# Patient Record
(demographics unavailable — no encounter records)

---

## 2025-04-01 NOTE — PHYSICAL EXAM
[Urethral Meatus] : meatus normal [Testes Tenderness] : no tenderness of the testes [Testes Mass (___cm)] : there were no testicular masses [Prostate Tenderness] : the prostate was not tender [de-identified] : RUTH: porostate non-tender, mild point enderness R pelvic floor [Chaperone Present] : A chaperone was present in the examining room during all aspects of the physical examination [FreeTextEntry2] : Mandi LOFTON

## 2025-04-01 NOTE — LETTER BODY
[Dear  ___] : Dear  [unfilled], [Courtesy Letter:] : I had the pleasure of seeing your patient, [unfilled], in my office today. [Please see my note below.] : Please see my note below. [Consult Closing:] : Thank you very much for allowing me to participate in the care of this patient.  If you have any questions, please do not hesitate to contact me. [Sincerely,] : Sincerely, [FreeTextEntry3] : Sherly Barnett MD Director of Robotic Education [facility] at [facility]  [email or web address] [phone] ([place]) [phone]  ([facility])

## 2025-04-01 NOTE — HISTORY OF PRESENT ILLNESS
[FreeTextEntry1] : Name BASILIA HERNANDEZ MRN [id number]  1985 Contact Number: [phone] ----------------------------------------------------------------------------------------------------------------------------------------- Date of First Visit: 23 Referring Provider/PCP: Dr. Serafin Graham (f. [phone]) -----------------------------------------------------------------------------------------------------------------------------------------  CC: testicular pain  History of Present Illness: BASILIA HERNANDEZ is a 38 year old male who presents for evaluation of left-sided testicular pain. Pain began about 3 weeks ago and is intermittent in frequency. Started as achiness tightness. Had similar pains in past that went away on its own, especially worse in college. Had been doing a lot of biking. Then last Friday became more comfortable. Worse with sitting still. Better with standing and stretching. Tylenol helps pain. Describes the pain as dull in quality without radiation. Left testicle feels heavier than right over this time. More surrounding the testicle in groin. By  pain had improved. Saw PCP on  - did not send any labs or urine studies, but started patient on cipro., which he started yesterday  Rated at 6/10 at its worst. Currently has 2-4/10 pain. No associated LUTS or gross hematuria. Denies any lumps or masses. Denies prior sexually transmitted infections. Denies prior scrotal trauma, abdominal surgery, scrotal surgery, or inguinal surgery. No fevers or chills. No N/V.  Patient reports in May/ had 1 month of frequent urination. Had urine tests which was reportedly wnl. Took course of doxycycline but patient did not complete. Symptoms resolved on their own. Also felt was sweatier around that time and got laser hair removal, which he felt resolved the sweatiness. No current urinary symptoms - mild frequency related to fluid intake. Patient not sexually active, and he is not concerned for STIs. Patient reports years ago had a UTI, but nothing recently.  Irritants: a lot of coffee, a lot of carbonation, + citrus, occasional tomatoes, moderate spicy food, moderate pineapple, moderate acidic foods  IPSS 5 QOL 2 MADHAVI 21  Scrotal US in office: Boderline varicocele on the right measuring 2.6 mm, Varicocele on the left 3.4 mm. Microcalcification on the left testes. ---------------------------------------------------------------------------------------------------------------------------------------- Interval History (2023):  Patient reports pain has largely improved, but not completely resolved at this time. Yoga/stretching has helped. Not bothersome overall.  Underwent SA at [facility]: normal volume, concentration 23.5, total count 58.75, 51% motility, 3% normal morphology (normal 4%). Discussed implications. ---------------------------------------------------------------------------------------------------------------------------------------- Interval History (2025):  Patient reports over last few months developed increased frequency, feeling of incomplete emptying, mild suprapubic discomfort. Saw PCP and urine studies and STI all wnl. Since improved on its own but not back to baseline. Now urinating every 1-2 hours depending on fluid. Also reports since last visit feels less volume/pressure ejaculate. Erections weaker than they were in the past. No more testicular pain - this issue resolved. Did not repeat SA - will do at some point in future.   IPSS 13 QOL 3 MADHAVI 17 PVR (to ensure adequate emptying): 2 ----------------------------------------------------------------------------------------------------------------------------------------  PMH: anxiety/depression PSH: none Family History: HTN, no  malignancies Social: works in finance, single, no children, never smoker, social alcohol, occasional edibles Meds: cipro currently, no bl meds Allergies: NKDA, seasonal ROS: no fevers, chills

## 2025-04-01 NOTE — HISTORY OF PRESENT ILLNESS
[FreeTextEntry1] : Name BASILIA HERNANDEZ MRN [id number]  1985 Contact Number: [phone] ----------------------------------------------------------------------------------------------------------------------------------------- Date of First Visit: 23 Referring Provider/PCP: Dr. Serafin Graham (f. [phone]) -----------------------------------------------------------------------------------------------------------------------------------------  CC: testicular pain  History of Present Illness: BASILIA HERNANDEZ is a 38 year old male who presents for evaluation of left-sided testicular pain. Pain began about 3 weeks ago and is intermittent in frequency. Started as achiness tightness. Had similar pains in past that went away on its own, especially worse in college. Had been doing a lot of biking. Then last Friday became more comfortable. Worse with sitting still. Better with standing and stretching. Tylenol helps pain. Describes the pain as dull in quality without radiation. Left testicle feels heavier than right over this time. More surrounding the testicle in groin. By  pain had improved. Saw PCP on  - did not send any labs or urine studies, but started patient on cipro., which he started yesterday  Rated at 6/10 at its worst. Currently has 2-4/10 pain. No associated LUTS or gross hematuria. Denies any lumps or masses. Denies prior sexually transmitted infections. Denies prior scrotal trauma, abdominal surgery, scrotal surgery, or inguinal surgery. No fevers or chills. No N/V.  Patient reports in May/ had 1 month of frequent urination. Had urine tests which was reportedly wnl. Took course of doxycycline but patient did not complete. Symptoms resolved on their own. Also felt was sweatier around that time and got laser hair removal, which he felt resolved the sweatiness. No current urinary symptoms - mild frequency related to fluid intake. Patient not sexually active, and he is not concerned for STIs. Patient reports years ago had a UTI, but nothing recently.  Irritants: a lot of coffee, a lot of carbonation, + citrus, occasional tomatoes, moderate spicy food, moderate pineapple, moderate acidic foods  IPSS 5 QOL 2 MADHAVI 21  Scrotal US in office: Boderline varicocele on the right measuring 2.6 mm, Varicocele on the left 3.4 mm. Microcalcification on the left testes. ---------------------------------------------------------------------------------------------------------------------------------------- Interval History (2023):  Patient reports pain has largely improved, but not completely resolved at this time. Yoga/stretching has helped. Not bothersome overall.  Underwent SA at [facility]: normal volume, concentration 23.5, total count 58.75, 51% motility, 3% normal morphology (normal 4%). Discussed implications. ---------------------------------------------------------------------------------------------------------------------------------------- Interval History (2025):  Patient reports over last few months developed increased frequency, feeling of incomplete emptying, mild suprapubic discomfort. Saw PCP and urine studies and STI all wnl. Since improved on its own but not back to baseline. Now urinating every 1-2 hours depending on fluid. Also reports since last visit feels less volume/pressure ejaculate. Erections weaker than they were in the past. No more testicular pain - this issue resolved. Did not repeat SA - will do at some point in future.   IPSS 13 QOL 3 MADHAVI 17 PVR (to ensure adequate emptying): 2 ----------------------------------------------------------------------------------------------------------------------------------------  PMH: anxiety/depression PSH: none Family History: HTN, no  malignancies Social: works in finance, single, no children, never smoker, social alcohol, occasional edibles Meds: cipro currently, no bl meds Allergies: NKDA, seasonal ROS: no fevers, chills

## 2025-04-01 NOTE — LETTER BODY
[Dear  ___] : Dear  [unfilled], [Courtesy Letter:] : I had the pleasure of seeing your patient, [unfilled], in my office today. [Please see my note below.] : Please see my note below. [Consult Closing:] : Thank you very much for allowing me to participate in the care of this patient.  If you have any questions, please do not hesitate to contact me. [Sincerely,] : Sincerely, [FreeTextEntry3] : Sherly Barnett MD Director of Robotic Education [facility] at [facility]  [email or web address] [phone] ([place]) [phone]  ([facility])

## 2025-04-01 NOTE — PHYSICAL EXAM
[Urethral Meatus] : meatus normal [Testes Tenderness] : no tenderness of the testes [Testes Mass (___cm)] : there were no testicular masses [Prostate Tenderness] : the prostate was not tender [de-identified] : RUTH: porostate non-tender, mild point enderness R pelvic floor [Chaperone Present] : A chaperone was present in the examining room during all aspects of the physical examination [FreeTextEntry2] : Mandi LOFTON

## 2025-04-01 NOTE — ASSESSMENT
[FreeTextEntry1] : BASILIA HERNANDEZ is a 40 year old male with previous left-sided orchalgia, resolved, now with episodic urinary frequency, signs/symptoms consistent with chronic prostatitis/chronic pelvic pain syndrome.   We discussed the relapsing and remitting course of this chronic syndrome as well as our treatment goals of decreasing the severity and frequency of symptoms. To make diagnosis pain should be present for 3 out of the last 6 months. The patient understands that this entity is challenging to treat due in part to the absence of a discrete, identifiable cause.   A multimodal strategy to evaluation and treatment is often necessary. This may include urinalysis and urine culture, two-glass lower urinary tract evaluation, urinary symptom inventory or index, sexual functioning assessment, urinary flow rate, post-void residual urine measurement. It is also important to identify potential triggers and ameliorants, and the impact of other co-existing issues such as GI issues (IBD, IBS, diverticulitis, constipation, etc.), neurological dysfunction, infectious causes, and pelvic floor muscle dysfunction.   We discussed the various treatment modalities, which may be of benefit. The following therapies have shown benefits in placebo- or sham-controlled studies: alpha-blockers, pregabalin, anti-inflammatory agents, selected neuro-stimulation and directed pelvic floor rehabilitation. We can also attempt extended antimicrobial therapy trial for select, newly diagnosed, antimicrobial-naive patients.  We will start with UA and urine culture to rule out infection/bacterial prostatitis, and send GC/TV/CT, Ureaplasma/mycoplasma.  Of note prior SA: 3% normal morphology, normal volume, normal count and concentration - discussed largely wnl but on lower end. We discussed that there are men who have abnormal semen parameters, yet they have contributed to a prior successful pregnancy through natural conception. Given no gross abnormalities and no infertility will monitor at this time - patient would like to get a second SA in future, but not at this time.  Discussed possible decreased ejaculate/erections over this time - patient less concerned and would like to manage urinary issues first.  Plan: - UA, urine culture - post RUTH - GC/TV/CT, Ureaplasma/mycoplasma. - Alfuzosin x 4 weeks - The side effects of Alfuzosin were discussed in detail. These include but are not limited to hives, changes in vision, orthostatic hypotension, dizziness, congestion, and retrograde ejaculation. Additionally, the patient should notify his ophthalmologist prior to any surgery for cataracts given the risk of floppy iris syndrome. - NSAID x 1 week - prescribed meloxicam - SItz baths - if fails above, consider pelvic floor PT - want to hold on any intervention on erections - fu 4-6 weeks to assess response